# Patient Record
Sex: FEMALE | Race: WHITE | NOT HISPANIC OR LATINO | URBAN - METROPOLITAN AREA
[De-identification: names, ages, dates, MRNs, and addresses within clinical notes are randomized per-mention and may not be internally consistent; named-entity substitution may affect disease eponyms.]

---

## 2017-10-13 ENCOUNTER — IMPORTED ENCOUNTER (OUTPATIENT)
Dept: URBAN - METROPOLITAN AREA CLINIC 50 | Facility: CLINIC | Age: 32
End: 2017-10-13

## 2017-10-20 ENCOUNTER — IMPORTED ENCOUNTER (OUTPATIENT)
Dept: URBAN - METROPOLITAN AREA CLINIC 50 | Facility: CLINIC | Age: 32
End: 2017-10-20

## 2018-10-11 ENCOUNTER — IMPORTED ENCOUNTER (OUTPATIENT)
Dept: URBAN - METROPOLITAN AREA CLINIC 50 | Facility: CLINIC | Age: 33
End: 2018-10-11

## 2019-01-04 ENCOUNTER — IMPORTED ENCOUNTER (OUTPATIENT)
Dept: URBAN - METROPOLITAN AREA CLINIC 50 | Facility: CLINIC | Age: 34
End: 2019-01-04

## 2020-01-03 ENCOUNTER — IMPORTED ENCOUNTER (OUTPATIENT)
Dept: URBAN - METROPOLITAN AREA CLINIC 50 | Facility: CLINIC | Age: 35
End: 2020-01-03

## 2020-07-07 ENCOUNTER — IMPORTED ENCOUNTER (OUTPATIENT)
Dept: URBAN - METROPOLITAN AREA CLINIC 50 | Facility: CLINIC | Age: 35
End: 2020-07-07

## 2020-07-28 ENCOUNTER — APPOINTMENT (RX ONLY)
Dept: URBAN - METROPOLITAN AREA CLINIC 56 | Facility: CLINIC | Age: 35
Setting detail: DERMATOLOGY
End: 2020-07-28

## 2020-07-28 DIAGNOSIS — D22 MELANOCYTIC NEVI: ICD-10-CM

## 2020-07-28 DIAGNOSIS — L81.4 OTHER MELANIN HYPERPIGMENTATION: ICD-10-CM

## 2020-07-28 PROBLEM — D48.5 NEOPLASM OF UNCERTAIN BEHAVIOR OF SKIN: Status: ACTIVE | Noted: 2020-07-28

## 2020-07-28 PROCEDURE — ? BIOPSY BY SHAVE METHOD

## 2020-07-28 PROCEDURE — ? COUNSELING

## 2020-07-28 PROCEDURE — ? ADDITIONAL NOTES

## 2020-07-28 PROCEDURE — 11102 TANGNTL BX SKIN SINGLE LES: CPT

## 2020-07-28 PROCEDURE — 99201: CPT | Mod: 25

## 2020-07-28 ASSESSMENT — LOCATION SIMPLE DESCRIPTION DERM
LOCATION SIMPLE: LEFT FOREHEAD
LOCATION SIMPLE: SCALP

## 2020-07-28 ASSESSMENT — LOCATION ZONE DERM
LOCATION ZONE: FACE
LOCATION ZONE: SCALP

## 2020-07-28 ASSESSMENT — LOCATION DETAILED DESCRIPTION DERM
LOCATION DETAILED: LEFT SUPERIOR FOREHEAD
LOCATION DETAILED: RIGHT SUPERIOR FRONTAL SCALP

## 2021-01-08 ENCOUNTER — IMPORTED ENCOUNTER (OUTPATIENT)
Dept: URBAN - METROPOLITAN AREA CLINIC 50 | Facility: CLINIC | Age: 36
End: 2021-01-08

## 2021-01-13 ENCOUNTER — IMPORTED ENCOUNTER (OUTPATIENT)
Dept: URBAN - METROPOLITAN AREA CLINIC 50 | Facility: CLINIC | Age: 36
End: 2021-01-13

## 2021-04-18 ASSESSMENT — TONOMETRY
OS_IOP_MMHG: 13
OS_IOP_MMHG: 15
OS_IOP_MMHG: 15
OD_IOP_MMHG: 15
OD_IOP_MMHG: 13
OD_IOP_MMHG: 15
OS_IOP_MMHG: 15
OD_IOP_MMHG: 15

## 2021-04-18 ASSESSMENT — VISUAL ACUITY
OD_CC: 20/25
OD_CC: 20/20
OD_CC: J1+
OS_CC: J1+
OS_CC: 20/20
OD_CC: J1+
OS_CC: 20/20
OS_CC: J1+
OS_CC: J1+
OD_CC: J1+
OS_CC: 20/20
OD_CC: 20/30
OS_CC: 20/20
OD_CC: J1+
OD_CC: 20/40+2
OS_CC: 20/20
OS_CC: 20/20
OD_CC: 20/30
OS_CC: J1+
OD_CC: 20/25

## 2021-12-21 ENCOUNTER — EMERGENCY VISIT (OUTPATIENT)
Dept: URBAN - METROPOLITAN AREA CLINIC 52 | Facility: CLINIC | Age: 36
End: 2021-12-21

## 2021-12-21 DIAGNOSIS — H20.9: ICD-10-CM

## 2021-12-21 PROCEDURE — 92014 COMPRE OPH EXAM EST PT 1/>: CPT

## 2021-12-21 PROCEDURE — 92134 CPTRZ OPH DX IMG PST SGM RTA: CPT

## 2021-12-21 RX ORDER — PREDNISOLONE ACETATE 10 MG/ML: 1 SUSPENSION/ DROPS OPHTHALMIC

## 2021-12-21 ASSESSMENT — VISUAL ACUITY
OD_SC: 20/40
OS_SC: 20/30-2
OU_SC: 20/30-1

## 2021-12-21 ASSESSMENT — TONOMETRY
OS_IOP_MMHG: 16
OD_IOP_MMHG: 15

## 2021-12-21 NOTE — PATIENT DISCUSSION
Starting patient on Pred Acetate 6x/day for 3 days, qid x 1 week, tid x 1 week, bid x 1 week, qday x 1 week.

## 2021-12-31 ENCOUNTER — FOLLOW UP (OUTPATIENT)
Dept: URBAN - METROPOLITAN AREA CLINIC 52 | Facility: CLINIC | Age: 36
End: 2021-12-31

## 2021-12-31 DIAGNOSIS — H20.9: ICD-10-CM

## 2021-12-31 PROCEDURE — 92012 INTRM OPH EXAM EST PATIENT: CPT

## 2021-12-31 ASSESSMENT — VISUAL ACUITY
OD_PH: 20/25
OU_CC: 20/20
OD_CC: 20/40
OS_CC: 20/20

## 2021-12-31 ASSESSMENT — TONOMETRY
OS_IOP_MMHG: 13
OD_IOP_MMHG: 12

## 2021-12-31 NOTE — PATIENT DISCUSSION
Patient states pain is better and 2 lines improvement today at exam. Recommend patient continue Pred for BID for 1 week, the next week once a day for a week and then stop GTT. Will see patient in 3 weeks (one week off GTT). Instructed patient to re start Pred 4 times a day if the flare up starts again before she sees us.

## 2021-12-31 NOTE — PATIENT DISCUSSION
Hx of MS, explained to the patient that underlying auto immune diseases can cause Uveitis as well as the chemo treatments she is receiving with her MS.

## 2022-01-21 ENCOUNTER — FOLLOW UP (OUTPATIENT)
Dept: URBAN - METROPOLITAN AREA CLINIC 52 | Facility: CLINIC | Age: 37
End: 2022-01-21

## 2022-01-21 DIAGNOSIS — H20.9: ICD-10-CM

## 2022-01-21 PROCEDURE — 92012 INTRM OPH EXAM EST PATIENT: CPT

## 2022-01-21 ASSESSMENT — TONOMETRY
OS_IOP_MMHG: 13
OD_IOP_MMHG: 13

## 2022-01-21 ASSESSMENT — VISUAL ACUITY
OS_CC: 20/20
OD_CC: 20/30-2

## 2022-02-25 ENCOUNTER — COMPREHENSIVE EXAM (OUTPATIENT)
Dept: URBAN - METROPOLITAN AREA CLINIC 52 | Facility: CLINIC | Age: 37
End: 2022-02-25

## 2022-02-25 DIAGNOSIS — H53.8: ICD-10-CM

## 2022-02-25 PROCEDURE — 92015 DETERMINE REFRACTIVE STATE: CPT

## 2022-02-25 PROCEDURE — 92134 CPTRZ OPH DX IMG PST SGM RTA: CPT

## 2022-02-25 PROCEDURE — 92014 COMPRE OPH EXAM EST PT 1/>: CPT

## 2022-02-25 ASSESSMENT — VISUAL ACUITY
OD_SC: 20/40
OU_CC: J1+
OU_CC: 20/20
OS_CC: 20/20
OU_SC: 20/20
OS_CC: J1+
OD_CC: 20/30-1
OS_SC: 20/20
OD_CC: J1

## 2022-02-25 ASSESSMENT — TONOMETRY
OS_IOP_MMHG: 12
OD_IOP_MMHG: 13

## 2022-02-25 NOTE — PATIENT DISCUSSION
Patient c/o not being able to see colors as clearly, dimming. Explained to the patient that is caused from damage to the nerve, although it isn't seen on any testing or slit lamp exam. Explained to the patient that it is caused from [address].

## 2024-02-23 ENCOUNTER — COMPREHENSIVE EXAM (OUTPATIENT)
Dept: URBAN - METROPOLITAN AREA CLINIC 52 | Facility: CLINIC | Age: 39
End: 2024-02-23

## 2024-02-23 DIAGNOSIS — G35: ICD-10-CM

## 2024-02-23 DIAGNOSIS — H52.4: ICD-10-CM

## 2024-02-23 PROCEDURE — 92014 COMPRE OPH EXAM EST PT 1/>: CPT

## 2024-02-23 PROCEDURE — 92015 DETERMINE REFRACTIVE STATE: CPT

## 2024-02-23 ASSESSMENT — VISUAL ACUITY
OS_GLARE: 20/20
OD_GLARE: 20/30
OS_CC: 20/20-1
OD_PH: 20/25-2
OS_GLARE: 20/20
OD_GLARE: 20/30
OU_CC: J2
OD_CC: 20/30-2

## 2024-02-23 ASSESSMENT — TONOMETRY
OD_IOP_MMHG: 12
OS_IOP_MMHG: 12

## 2025-04-09 ENCOUNTER — APPOINTMENT (OUTPATIENT)
Dept: URBAN - METROPOLITAN AREA CLINIC 56 | Facility: CLINIC | Age: 40
Setting detail: DERMATOLOGY
End: 2025-04-09

## 2025-04-09 DIAGNOSIS — D18.0 HEMANGIOMA: ICD-10-CM | Status: STABLE

## 2025-04-09 DIAGNOSIS — L82.1 OTHER SEBORRHEIC KERATOSIS: ICD-10-CM | Status: STABLE

## 2025-04-09 DIAGNOSIS — L57.8 OTHER SKIN CHANGES DUE TO CHRONIC EXPOSURE TO NONIONIZING RADIATION: ICD-10-CM | Status: STABLE

## 2025-04-09 DIAGNOSIS — D22 MELANOCYTIC NEVI: ICD-10-CM | Status: STABLE

## 2025-04-09 DIAGNOSIS — L81.4 OTHER MELANIN HYPERPIGMENTATION: ICD-10-CM | Status: STABLE

## 2025-04-09 DIAGNOSIS — Z87.2 PERSONAL HISTORY OF DISEASES OF THE SKIN AND SUBCUTANEOUS TISSUE: ICD-10-CM | Status: STABLE

## 2025-04-09 PROBLEM — D22.61 MELANOCYTIC NEVI OF RIGHT UPPER LIMB, INCLUDING SHOULDER: Status: ACTIVE | Noted: 2025-04-09

## 2025-04-09 PROBLEM — D18.01 HEMANGIOMA OF SKIN AND SUBCUTANEOUS TISSUE: Status: ACTIVE | Noted: 2025-04-09

## 2025-04-09 PROCEDURE — ? COUNSELING

## 2025-04-09 PROCEDURE — ? TREATMENT REGIMEN

## 2025-04-09 PROCEDURE — 99203 OFFICE O/P NEW LOW 30 MIN: CPT

## 2025-04-09 ASSESSMENT — LOCATION DETAILED DESCRIPTION DERM
LOCATION DETAILED: LEFT ANTERIOR SHOULDER
LOCATION DETAILED: EPIGASTRIC SKIN
LOCATION DETAILED: RIGHT ANTERIOR SHOULDER
LOCATION DETAILED: RIGHT ANTERIOR PROXIMAL UPPER ARM
LOCATION DETAILED: LEFT SUPERIOR UPPER BACK

## 2025-04-09 ASSESSMENT — LOCATION ZONE DERM
LOCATION ZONE: TRUNK
LOCATION ZONE: ARM

## 2025-04-09 ASSESSMENT — LOCATION SIMPLE DESCRIPTION DERM
LOCATION SIMPLE: LEFT UPPER BACK
LOCATION SIMPLE: RIGHT SHOULDER
LOCATION SIMPLE: ABDOMEN
LOCATION SIMPLE: LEFT SHOULDER
LOCATION SIMPLE: RIGHT UPPER ARM